# Patient Record
Sex: FEMALE | Race: OTHER | HISPANIC OR LATINO | URBAN - METROPOLITAN AREA
[De-identification: names, ages, dates, MRNs, and addresses within clinical notes are randomized per-mention and may not be internally consistent; named-entity substitution may affect disease eponyms.]

---
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
## 2022-09-10 NOTE — ED PEDIATRIC NURSE NOTE - WEIGHT BEARING STATUS MAINTAINED
Principal Discharge DX:	Laceration of left leg  Secondary Diagnosis:	Dog scratch   1 non-weight bearing right